# Patient Record
Sex: FEMALE | Race: WHITE
[De-identification: names, ages, dates, MRNs, and addresses within clinical notes are randomized per-mention and may not be internally consistent; named-entity substitution may affect disease eponyms.]

---
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
## 2025-02-12 ENCOUNTER — HOSPITAL ENCOUNTER (OUTPATIENT)
Dept: HOSPITAL 92 - SCSRAD | Age: 20
Discharge: HOME | End: 2025-02-12
Attending: NURSE PRACTITIONER
Payer: COMMERCIAL

## 2025-02-12 DIAGNOSIS — R06.02: Primary | ICD-10-CM

## 2025-02-12 PROCEDURE — 71046 X-RAY EXAM CHEST 2 VIEWS: CPT

## 2025-04-07 ENCOUNTER — APPOINTMENT (OUTPATIENT)
Dept: URBAN - METROPOLITAN AREA CLINIC 99 | Facility: CLINIC | Age: 20
Setting detail: DERMATOLOGY
End: 2025-04-07

## 2025-04-07 DIAGNOSIS — Z71.89 OTHER SPECIFIED COUNSELING: ICD-10-CM

## 2025-04-07 DIAGNOSIS — L70.0 ACNE VULGARIS: ICD-10-CM

## 2025-04-07 PROCEDURE — ? TREATMENT REGIMEN

## 2025-04-07 PROCEDURE — ? SUNSCREEN RECOMMENDATIONS

## 2025-04-07 PROCEDURE — ? PRESCRIPTION

## 2025-04-07 PROCEDURE — 99214 OFFICE O/P EST MOD 30 MIN: CPT

## 2025-04-07 PROCEDURE — ? COUNSELING

## 2025-04-07 PROCEDURE — ? ADDITIONAL NOTES

## 2025-04-07 RX ORDER — MINOCYCLINE HYDROCHLORIDE 100 MG/1
1 CAPSULE ORAL QD
Qty: 30 | Refills: 2 | Status: ERX | COMMUNITY
Start: 2025-04-07

## 2025-04-07 RX ADMIN — MINOCYCLINE HYDROCHLORIDE 1: 100 CAPSULE ORAL at 00:00

## 2025-04-07 ASSESSMENT — LOCATION DETAILED DESCRIPTION DERM
LOCATION DETAILED: LEFT CENTRAL MALAR CHEEK
LOCATION DETAILED: UPPER STERNUM
LOCATION DETAILED: RIGHT CENTRAL MALAR CHEEK
LOCATION DETAILED: SUPERIOR THORACIC SPINE

## 2025-04-07 ASSESSMENT — LOCATION ZONE DERM
LOCATION ZONE: FACE
LOCATION ZONE: TRUNK

## 2025-04-07 ASSESSMENT — LOCATION SIMPLE DESCRIPTION DERM
LOCATION SIMPLE: CHEST
LOCATION SIMPLE: UPPER BACK
LOCATION SIMPLE: RIGHT CHEEK
LOCATION SIMPLE: LEFT CHEEK

## 2025-04-07 NOTE — PROCEDURE: TREATMENT REGIMEN
Detail Level: Zone
Modify Regimen: - minocycline 50mg bid SWITCH to minocycline 100 mg capsule Qd
Continue Regimen: - Epiduo 0.1 %-2.5 % topical gel with pump Qhs\\n- clindamycin 1 % lotion Bid

## 2025-04-07 NOTE — PROCEDURE: ADDITIONAL NOTES
Additional Notes: Pt states she has not been consistent on oral Abx. Discussed importance of consistency. Discussed switching minocycline to 100mg qd instead of 50mg bid to help with consistency. Pt would like to proceed.  Discussed using topicals on back and chest. Pt expresses understanding. Pt declines refill on topicals.
Detail Level: Simple
Render Risk Assessment In Note?: no

## 2025-08-20 ENCOUNTER — APPOINTMENT (OUTPATIENT)
Dept: URBAN - METROPOLITAN AREA CLINIC 99 | Facility: CLINIC | Age: 20
Setting detail: DERMATOLOGY
End: 2025-08-20

## 2025-08-20 DIAGNOSIS — Z71.89 OTHER SPECIFIED COUNSELING: ICD-10-CM

## 2025-08-20 DIAGNOSIS — L70.0 ACNE VULGARIS: ICD-10-CM | Status: IMPROVED

## 2025-08-20 PROCEDURE — ? ADDITIONAL NOTES

## 2025-08-20 PROCEDURE — ? PRESCRIPTION

## 2025-08-20 PROCEDURE — ? TREATMENT REGIMEN

## 2025-08-20 PROCEDURE — ? COUNSELING

## 2025-08-20 PROCEDURE — ? SUNSCREEN RECOMMENDATIONS

## 2025-08-20 RX ORDER — CLINDAMYCIN PHOSPHATE 10 MG/ML
PEA SIZE AMOUNT LOTION TOPICAL BID
Qty: 60 | Refills: 0 | Status: ERX

## 2025-08-20 ASSESSMENT — LOCATION DETAILED DESCRIPTION DERM
LOCATION DETAILED: SUPERIOR THORACIC SPINE
LOCATION DETAILED: LEFT CENTRAL MALAR CHEEK
LOCATION DETAILED: RIGHT CENTRAL MALAR CHEEK
LOCATION DETAILED: UPPER STERNUM

## 2025-08-20 ASSESSMENT — LOCATION ZONE DERM
LOCATION ZONE: TRUNK
LOCATION ZONE: FACE

## 2025-08-20 ASSESSMENT — LOCATION SIMPLE DESCRIPTION DERM
LOCATION SIMPLE: LEFT CHEEK
LOCATION SIMPLE: CHEST
LOCATION SIMPLE: RIGHT CHEEK
LOCATION SIMPLE: UPPER BACK